# Patient Record
Sex: FEMALE
[De-identification: names, ages, dates, MRNs, and addresses within clinical notes are randomized per-mention and may not be internally consistent; named-entity substitution may affect disease eponyms.]

---

## 2017-09-17 ENCOUNTER — HEALTH MAINTENANCE LETTER (OUTPATIENT)
Age: 57
End: 2017-09-17

## 2019-07-05 ENCOUNTER — TRANSFERRED RECORDS (OUTPATIENT)
Dept: HEALTH INFORMATION MANAGEMENT | Facility: CLINIC | Age: 59
End: 2019-07-05

## 2019-08-01 ENCOUNTER — TRANSFERRED RECORDS (OUTPATIENT)
Dept: HEALTH INFORMATION MANAGEMENT | Facility: CLINIC | Age: 59
End: 2019-08-01

## 2019-08-12 NOTE — TELEPHONE ENCOUNTER
ONCOLOGY INTAKE: Records Information      APPT INFORMATION:  Referring provider:  MADELINE  Referring provider s clinic:  NA  Reason for visit/diagnosis:  2nd opinion - Metastatic breast Cancer to spine  Has patient been notified of appointment date and time?: Per PT    RECORDS INFORMATION:  Were the records received with the referral (via [employer])? No    Has patient been seen for any external appt for this diagnosis? Per PT:    [facility] ([facility]) for radiation  [facility] - Consult and infusion  [facility] - [facility]  Primary: Dr. Tyson - [facility] [place] [facility] [place]    ADDITIONAL INFORMATION:  NA

## 2019-08-13 ENCOUNTER — TRANSFERRED RECORDS (OUTPATIENT)
Dept: HEALTH INFORMATION MANAGEMENT | Facility: CLINIC | Age: 59
End: 2019-08-13

## 2019-08-13 NOTE — TELEPHONE ENCOUNTER
RECORDS STATUS - ALL OTHER DIAGNOSIS      RECORDS RECEIVED FROM: [facility]   DATE RECEIVED: PENDING   NOTES STATUS DETAILS   OFFICE NOTE from referring provider YES [facility]   OFFICE NOTE from medical oncologist NA    DISCHARGE SUMMARY from hospital NA    DISCHARGE REPORT from the ER NA    OPERATIVE REPORT NA    MEDICATION LIST YES CE   CLINICAL TRIAL TREATMENTS TO DATE     LABS YES [facility]   PATHOLOGY REPORTS RECEIVED SENT TO 5TH FLOOR PATH   ANYTHING RELATED TO DIAGNOSIS NA    GENONOMIC TESTING NA    TYPE:     IMAGING (NEED IMAGES & REPORT) PENDING    CT SCANS     MRI     MAMMO     ULTRASOUND     PET       Action ANNA   Action Taken RECEIVED

## 2019-08-21 LAB — COPATH REPORT: NORMAL

## 2019-08-21 PROCEDURE — 00000346 ZZHCL STATISTIC REVIEW OUTSIDE SLIDES TC 88321: Performed by: INTERNAL MEDICINE

## 2019-08-29 NOTE — PROGRESS NOTES
Oncology Consultation:  Date on this visit: 9/4/2019    Carlota Campbell is self referred for an oncology consultation. She requires evaluation for metastatic ER positive breast cancer.    Primary Physician: Anu De Leon     History Of Present Illness:  Ms. Campbell is a 59 year old female with left breast cancer metastasized to lymph nodes, bone, and lungs.  She self palpated a lump in her right breast a number of years ago.  In 03/2019, she began having progressive low back pain.  In the ensuing months, she developed bilateral lower extremity radiculopathy as well as some numbness of the thighs and below the knees.   Her PCP ordered an MRI of the L-spine which showed multiple osseous metastases of the T- and L-spine, compression fracture at L3 with retropulsion into the spinal canal causing moderate spinal canal stenosis, compression fracture of L5 with slight retropulsion, large metastases at L2, L3, and L5, and moderate metastases at L4 and T11.  Epidural metastatic disease was noted bilaterally at L2-3 and L3-4.  In addition, there was a large metastasis in the right iliac bone.  Breast exam showed an ulcerated right breast mass.  Mammogram showed a 6.2 cm mass and at least 7 or 8 associated masses in the right breast as well as right axillary LAD.  Right breast biopsy showed grade 3 invasive carcinoma, ER and NM positive in approximately 90%, and HER-2 non-amplified by IHC.  She completed palliative radiation to the low back on 7/23/19 (these records are not available today).  She met with Dr. Carlene Roldan of medical oncology at [facility].  She started letrozole on 7/8/19.   She started ribociclib on 8/19/19, upon completion of radiation therapy.  It was recommended she also start Zometa, but she has not yet initiated this.    Ms. Campbell presents to clinic today to obtain a second opinion regarding her breast cancer diagnosis and treatment. She has been tolerating treatment with letrozole well  with no reported menopausal-like side effects. She reports that her back pain has improved significantly since completing radiation therapy, and she has been able to reduce her ibuprofen and tizanidine use. However, the radiation did result in 2-3 weeks of diarrhea for which she required IV potassium replacement. These symptoms have since resolved. Similarly, she has not yet experienced side effects from the ribociclib, which she began after completing XRT. She had an EKG yesterday that showed no prolongation of the QT/QTc. She feels well overall and says she feels like the therapy is working, citing the reduction in her back pain and the improvement of former tingling sensations in her right breast. She does endorse a numbing/tingling sensation in her bilateral upper thighs, though this does not interfere with her balance or ambulation. Of note, she does not describe it as the sharp radiating pain of sciatica. She denies bowel or bladder retention or incontinence.  She expressed an interest in physical therapy to help strengthen her back and further improve her pain. Finally, this past Thursday (8/29/2019), she developed an erythematous and mildly pruritic rash over her bilateral legs; the major focus is over the anterolateral aspect of her right thigh. She has been managing this with moisturizing lotion and topical hydrocortisone with moderate relief. Otherwise, she has not been ill recently and she denies fevers, chills, night sweats, weight loss, headaches, changes in vision/hearing, chest pain, SOB, and new/worsening MSK pain.  The remainder of a complete 12 point review of systems was reviewed with the patient and was negative with the exception of that mentioned above.      Past Medical/Surgical History:  Past Medical History:   Diagnosis Date     Elevated blood pressure      Esophageal dysmotility      GERD      Hiatal hernia     small     Multinodular goiter      Past Surgical History:   Procedure  "Laterality Date     none       Allergies:  Allergies as of 09/04/2019 - Reviewed 12/10/2010   Allergen Reaction Noted     Nkda [no known drug allergies]  11/11/2009     Current Medications:  Current Outpatient Medications   Medication Sig Dispense Refill     PRILOSEC PO as needed         Family History:  She denies a family history of malignancy. Her father had late-onset type 2 diabetes mellitus (onset in 70's) and had a myocardial infarction. Her mother has bullous pemphigoid. She has no children.    Social History:  She works at the [employer]; her boss is supportive of her diagnosis. She is  to , Marco.  She does not have any children.  She is a lifetime nonsmoker.  She very rarely drinks alcohol (none this year), and never consumes alcohol with her medications.    Physical Exam:  BP (!) 154/92 (BP Location: Right arm, Patient Position: Sitting, Cuff Size: Adult Regular)   Pulse 85   Temp 98.1  F (36.7  C) (Oral)   Resp 16   Ht 1.626 m (5' 4.02\")   Wt 82.5 kg (181 lb 14.4 oz)   SpO2 97%   BMI 31.21 kg/m    General:  Well appearing, obese adult female in NAD.  HEENT:  Normocephalic.  Sclera anicteric.  MMM.  No lesions of the oropharynx.  Lymph:  No palpable cervical, supraclavicular, or left axillary LAD. Approx. 3 cm node(s) palpable in the right axilla.  Chest:  CTA bilaterally.  No wheezes or crackles.  CV:  RRR.  Nl S1 and S2.  No m/r/g.  Breast:  Bilateral breast are grossly symmetric. Right nipple is retracted and ulcerated with a 5 cm x 7 cm patch of surrounding erythema and induration; there is moderate discharge. There are several small (<5 mm) erythematous, indurated intradermal nodules surrounding the right areola. No additional masses are palpable in the right breast. No masses are palpable in the left breast, which appears grossly normal.  Abd:  Soft/NT/ND.  BSs normoactive.  No hepatosplenomegaly.  Ext:  No pitting edema of the bilateral lower extremities.  Pulses 2+ and " symmetric.  Musculo:  Strength 5/5 throughout.  Neuro:  Cranial nerves grossly intact.  Psych:  Mood and affect appear normal.    Laboratory/Imaging Studies:  7/2/19 Right breast biopsy, 12:00, central:  - Grade 3 invasive mammary carcinoma  - ER strong in >90%   - RI strong in 90%  - HER-2 non-amplified by IHC (score 1+)    7/2/19 Right breast biopsy 11:00, 15 cm from the nipple:  - Grade 3 invasive mammary carcinoma    7/5/19 PET/CT:  - Several medial right supraclavicular FDG avid lymph nodes measuring up to 8 mm in largest size with max SUV of 27.3.    - Small focal increased FDG activity within the right and left lobes of the thyroid at the site of thyroid nodules.  - Multiple FDG avid bilateral pulmonary nodules c/w pulmonary metastasis.    - Multiple small FDG avid mediastinal, bilateral hilar, right axillary, and subcutaneous soft tissue densities and lymph nodes c/w extensive metastasis.  - Large ulcerative right breast mass.  - Small retroperitoneal para-aortic FDG avid lymph nodes.  - Multiple sites of lytic and sclerotic FDG avid bone metastasis involving the bilateral ribs, thoracolumbar spine, and pelvis.  Small FDG avid left gluteal muscle probably metastasis.    8/13/19 Labs:  Alk phos 270    AST 77    ASSESSMENT/PLAN:  Mrs. Campbell is a 59 year old female with [id number] ER+/RI+/HER2 non-amplified Invasive Ductal Carcinoma (grade III) with metastases to the lumbar spine, iliac crests, pulmonary nodules, and numerous axillary and distant lymph nodes s/p 15 cycles of XRT to the lumbar spine (records not available). She is currently on letrozole (7/8/2015) and ribociclib (8/5/2019) with plans to start bisphosphonate therapy pending dental work.     1. Metastatic Invasive Ductal Carcinoma, [id number]  We reviewed the nature of stage IV breast cancer.  We discussed today that while her breast cancer is treatable, it is not at this stage curable.  The goal of treatment is therefore to prolong overall  survival while maintaining quality of life.  As such, in the treatment of metastatic breast cancer, patients are often treated with 1-2 medications at a time in order to limit toxicities for multiple concomitant therapies.  Treatment is continued until either disease progression or intolerable toxicity, at which time another treatment can be tried.    We reinforced that she has been started on the proper treatment. We reviewed that in the treatment of metastatic ER positive breast cancer, unless evidence of organ compromise/impending organ failure, initiation of treatment with endocrine based rather than chemotherapy is recommended.  We discussed this is because endocrine therapies tend to have longer time until disease progression and are associated with less toxicities than chemotherapy.  We discussed standard first line treatment is with combination CDK 4/6 inhibitor and an aromatase inhibitor.  We reviewed that phase III trials of this combination showed prolongation of PFS from approximately 8-10 months with AI alone to approximately 2 years with combined CDK4/6 inhibitor and AI.   Therefore first line treatment with ribociclib and letrozole is reasonable in her case.  We reviewed the potential side effects of both ribociclib and letrozole.      We discussed next line treatment options.  We recommend she have PIK3CA testing of her tumor at this time, in order to evaluate whether future treatment with alpelisib and fulvestrant is a treatment option.  Treatment with everolimus in combination with either exemestane and fulvestrant is also another option.  We do not have a clinical trial for which she is currently eligible, given that she has already started a CDK 4/6 inhibitor, but may have clinical trial options for her when she is considering next lines of therapy.  I encouraged her to consider clinical trials in the future as this will add additional treatment options.    In regards to her bone metastases, pain  is currently well controlled on her current regimen of ibu profen and tizanidine.  We reviewed the benefit of zometa in preventing adverse skeletal affects of having cancer in the bones as well as in the prevention of further bone metastases.  We reviewed the potential side effects of zometa and encouraged her to get her necessary dental work completed as soon as possible so that she may start this medication.  We recommend she continue calcium and vitamin D supplementation and would also refer her to physical therapy to help strengthen her back and minimize the resultant pain.     She asked about her prognosis.  Based on current data the average survival of metastatic ER positive breast cancer is roughly 3-5 years. We counseled her that this is an average, and that her course may be shorter or longer. She expressed her understanding of this. We discussed various local support groups for people living with metastatic breast cancer, which she expressed interest in.  We also gave her information on the 3rd annual [place] Metastatic breast cancer conference that will take place on 11/2/19.    In terms of treatment monitoring, we would recommend she have monthly visits to monitor clinical evidence of response.  In addition, we recommend drawing tumor markers (e.g., CEA and ether CA 27.29 or CA 15-3) now and monthly. We discussed the nature of tumor markers, that they are helpful in monitoring response of some patients to treatment but not in others, and the importance of the trend rather than a single value in determining treatment response.  We would also repeat her PET/CT in 2-3 months to assess treatment response.    Summary of recommendations:  -- Continue letrozole and ribociclib regimen as long as effective  -- Initiate bisphosphonate therapy (Zometa or Xgeva pending insurance) as soon as cleared by dentistry  -- Draw serum tumor antigens (CEA and CA 27.29) now and every month  -- Repeat PET/CT in 2-3 months to  assess response  -- Refer to physical therapy for back strengthening exercises  -- Refer to local support groups for people living with metastatic breast cancer     2.  Transaminitis:  Her LFTs were noted to be elevated on 8/13/2019. She says this draw was prior to her staring the ribociclib, and that she was taking Tylenol at that time to manage her bone pain. She has since stopped taking the Tylenol, as pain improved with XRT. We recommended that her LFTs be rechecked to either document improvement or, if worsening, lend consideration to further work up including hepatitis serologies, serum ferritin, and evaluation for fatty liver.     3. Erythematous Urticarious Rash  Developed an erythematous, urticarious, pruritic rash over the anterolateral aspect of the right thigh with small additional foci on the bilateral lower legs. Has been using moisturizing lotion and topical hydrocortisone with some relief.  -- Continue topical hydrocortisone OTC  -- Start OTC topical diphenhydramine    4. Hypertension  /92 this visit, likely white coat hypertension.   -- Follow up with PCP for further evaluation (including possible ambulatory BP monitoring)    5. GERD  She has a remote history of GERD and was treated with Prilosec. She says her symptoms spontaneously improved several years ago. She takes Tums occasionally (once every few months).  -- Tums PRN  -- Consider EGD if symptoms worsen    6. Enlarged Thyroid  Remote history of enlarged thyroid. She says it was biopsied without suspicious findings. There was no follow-up.  -- Will follow on subsequent imaging    7.  Follow Up:  Patient plans to resume care with Dr. Roldan of [facility] at this time, therefore will not schedule follow up in this clinic.  I provided her with our contact information and asked that she contact us with any future questions.    Patient seen and discussed with attending physician, Dr. Umm Kasper.    Clay Crouch, MS4  Oncology  Service    Patient was seen in conjunction with the medical student.  I have edited the above note to reflect our joint assessment and plan.  A total of 50 minutes of my 60 minute face to face visit was spent in counseling.    Umm Kasper MD

## 2019-09-04 ENCOUNTER — PRE VISIT (OUTPATIENT)
Dept: ONCOLOGY | Facility: CLINIC | Age: 59
End: 2019-09-04

## 2019-09-04 ENCOUNTER — CARE COORDINATION (OUTPATIENT)
Dept: ONCOLOGY | Facility: CLINIC | Age: 59
End: 2019-09-04

## 2019-09-04 ENCOUNTER — ONCOLOGY VISIT (OUTPATIENT)
Dept: ONCOLOGY | Facility: CLINIC | Age: 59
End: 2019-09-04
Attending: INTERNAL MEDICINE
Payer: COMMERCIAL

## 2019-09-04 VITALS
WEIGHT: 181.9 LBS | HEART RATE: 85 BPM | OXYGEN SATURATION: 97 % | RESPIRATION RATE: 16 BRPM | BODY MASS INDEX: 31.05 KG/M2 | SYSTOLIC BLOOD PRESSURE: 154 MMHG | TEMPERATURE: 98.1 F | DIASTOLIC BLOOD PRESSURE: 92 MMHG | HEIGHT: 64 IN

## 2019-09-04 DIAGNOSIS — C50.912 BREAST CANCER METASTASIZED TO MULTIPLE SITES, LEFT (H): Primary | ICD-10-CM

## 2019-09-04 PROCEDURE — G0463 HOSPITAL OUTPT CLINIC VISIT: HCPCS | Mod: ZF

## 2019-09-04 PROCEDURE — 99205 OFFICE O/P NEW HI 60 MIN: CPT | Mod: ZP | Performed by: INTERNAL MEDICINE

## 2019-09-04 RX ORDER — LETROZOLE AND RIBOCICLIB 600-2.5 MG
KIT ORAL
COMMUNITY
Start: 2019-08-30

## 2019-09-04 RX ORDER — IBUPROFEN 400 MG/1
200 TABLET, FILM COATED ORAL PRN
COMMUNITY

## 2019-09-04 RX ORDER — LETROZOLE 2.5 MG/1
1 TABLET, FILM COATED ORAL DAILY
Refills: 0 | COMMUNITY
Start: 2019-07-08

## 2019-09-04 ASSESSMENT — PAIN SCALES - GENERAL: PAINLEVEL: MILD PAIN (2)

## 2019-09-04 ASSESSMENT — MIFFLIN-ST. JEOR: SCORE: 1375.34

## 2019-09-04 NOTE — LETTER
9/4/2019       RE: Carlota Campbell  [address] [facility] [place][note 2] [id number]     Dear Colleague,    Thank you for referring your patient, Carlota Campbell, to the [facility]. Please see a copy of my visit note below.    Oncology Consultation:  Date on this visit: 9/4/2019    Carlota Campbell is self referred for an oncology consultation. She requires evaluation for metastatic ER positive breast cancer.    Primary Physician: Anu De Leon     History Of Present Illness:  Ms. Campbell is a 59 year old female with left breast cancer metastasized to lymph nodes, bone, and lungs.  She self palpated a lump in her right breast a number of years ago.  In 03/2019, she began having progressive low back pain.  In the ensuing months, she developed bilateral lower extremity radiculopathy as well as some numbness of the thighs and below the knees.   Her PCP ordered an MRI of the L-spine which showed multiple osseous metastases of the T- and L-spine, compression fracture at L3 with retropulsion into the spinal canal causing moderate spinal canal stenosis, compression fracture of L5 with slight retropulsion, large metastases at L2, L3, and L5, and moderate metastases at L4 and T11.  Epidural metastatic disease was noted bilaterally at L2-3 and L3-4.  In addition, there was a large metastasis in the right iliac bone.  Breast exam showed an ulcerated right breast mass.  Mammogram showed a 6.2 cm mass and at least 7 or 8 associated masses in the right breast as well as right axillary LAD.  Right breast biopsy showed grade 3 invasive carcinoma, ER and NJ positive in approximately 90%, and HER-2 non-amplified by IHC.  She completed palliative radiation to the low back on 7/23/19 (these records are not available today).  She met with Dr. Carlene Roldan of medical oncology at [facility].  She started letrozole on 7/8/19.   She started ribociclib on 8/19/19, upon completion of radiation therapy.  It was  recommended she also start Zometa, but she has not yet initiated this.    Ms. Campbell presents to clinic today to obtain a second opinion regarding her breast cancer diagnosis and treatment. She has been tolerating treatment with letrozole well with no reported menopausal-like side effects. She reports that her back pain has improved significantly since completing radiation therapy, and she has been able to reduce her ibuprofen and tizanidine use. However, the radiation did result in 2-3 weeks of diarrhea for which she required IV potassium replacement. These symptoms have since resolved. Similarly, she has not yet experienced side effects from the ribociclib, which she began after completing XRT. She had an EKG yesterday that showed no prolongation of the QT/QTc. She feels well overall and says she feels like the therapy is working, citing the reduction in her back pain and the improvement of former tingling sensations in her right breast. She does endorse a numbing/tingling sensation in her bilateral upper thighs, though this does not interfere with her balance or ambulation. Of note, she does not describe it as the sharp radiating pain of sciatica. She denies bowel or bladder retention or incontinence.  She expressed an interest in physical therapy to help strengthen her back and further improve her pain. Finally, this past Thursday (8/29/2019), she developed an erythematous and mildly pruritic rash over her bilateral legs; the major focus is over the anterolateral aspect of her right thigh. She has been managing this with moisturizing lotion and topical hydrocortisone with moderate relief. Otherwise, she has not been ill recently and she denies fevers, chills, night sweats, weight loss, headaches, changes in vision/hearing, chest pain, SOB, and new/worsening MSK pain.  The remainder of a complete 12 point review of systems was reviewed with the patient and was negative with the exception of that mentioned  "above.      Past Medical/Surgical History:  Past Medical History:   Diagnosis Date     Elevated blood pressure      Esophageal dysmotility      GERD      Hiatal hernia     small     Multinodular goiter      Past Surgical History:   Procedure Laterality Date     none       Allergies:  Allergies as of 09/04/2019 - Reviewed 12/10/2010   Allergen Reaction Noted     Nkda [no known drug allergies]  11/11/2009     Current Medications:  Current Outpatient Medications   Medication Sig Dispense Refill     PRILOSEC PO as needed         Family History:  She denies a family history of malignancy. Her father had late-onset type 2 diabetes mellitus (onset in 70's) and had a myocardial infarction. Her mother has bullous pemphigoid. She has no children.    Social History:  She works at the [employer]; her boss is supportive of her diagnosis. She is  to , Marco.  She does not have any children.  She is a lifetime nonsmoker.  She very rarely drinks alcohol (none this year), and never consumes alcohol with her medications.    Physical Exam:  BP (!) 154/92 (BP Location: Right arm, Patient Position: Sitting, Cuff Size: Adult Regular)   Pulse 85   Temp 98.1  F (36.7  C) (Oral)   Resp 16   Ht 1.626 m (5' 4.02\")   Wt 82.5 kg (181 lb 14.4 oz)   SpO2 97%   BMI 31.21 kg/m     General:  Well appearing, obese adult female in NAD.  HEENT:  Normocephalic.  Sclera anicteric.  MMM.  No lesions of the oropharynx.  Lymph:  No palpable cervical, supraclavicular, or left axillary LAD. Approx. 3 cm node(s) palpable in the right axilla.  Chest:  CTA bilaterally.  No wheezes or crackles.  CV:  RRR.  Nl S1 and S2.  No m/r/g.  Breast:  Bilateral breast are grossly symmetric. Right nipple is retracted and ulcerated with a 5 cm x 7 cm patch of surrounding erythema and induration; there is moderate discharge. There are several small (<5 mm) erythematous, indurated intradermal nodules surrounding the right areola. No additional masses are " palpable in the right breast. No masses are palpable in the left breast, which appears grossly normal.  Abd:  Soft/NT/ND.  BSs normoactive.  No hepatosplenomegaly.  Ext:  No pitting edema of the bilateral lower extremities.  Pulses 2+ and symmetric.  Musculo:  Strength 5/5 throughout.  Neuro:  Cranial nerves grossly intact.  Psych:  Mood and affect appear normal.    Laboratory/Imaging Studies:  7/2/19 Right breast biopsy, 12:00, central:  - Grade 3 invasive mammary carcinoma  - ER strong in >90%   - OR strong in 90%  - HER-2 non-amplified by IHC (score 1+)    7/2/19 Right breast biopsy 11:00, 15 cm from the nipple:  - Grade 3 invasive mammary carcinoma    7/5/19 PET/CT:  - Several medial right supraclavicular FDG avid lymph nodes measuring up to 8 mm in largest size with max SUV of 27.3.    - Small focal increased FDG activity within the right and left lobes of the thyroid at the site of thyroid nodules.  - Multiple FDG avid bilateral pulmonary nodules c/w pulmonary metastasis.    - Multiple small FDG avid mediastinal, bilateral hilar, right axillary, and subcutaneous soft tissue densities and lymph nodes c/w extensive metastasis.  - Large ulcerative right breast mass.  - Small retroperitoneal para-aortic FDG avid lymph nodes.  - Multiple sites of lytic and sclerotic FDG avid bone metastasis involving the bilateral ribs, thoracolumbar spine, and pelvis.  Small FDG avid left gluteal muscle probably metastasis.    8/13/19 Labs:  Alk phos 270    AST 77    ASSESSMENT/PLAN:  Mrs. Campbell is a 59 year old female with [id number] ER+/OR+/HER2 non-amplified Invasive Ductal Carcinoma (grade III) with metastases to the lumbar spine, iliac crests, pulmonary nodules, and numerous axillary and distant lymph nodes s/p 15 cycles of XRT to the lumbar spine (records not available). She is currently on letrozole (7/8/2015) and ribociclib (8/5/2019) with plans to start bisphosphonate therapy pending dental work.     1. Metastatic  Invasive Ductal Carcinoma, [id number]  We reviewed the nature of stage IV breast cancer.  We discussed today that while her breast cancer is treatable, it is not at this stage curable.  The goal of treatment is therefore to prolong overall survival while maintaining quality of life.  As such, in the treatment of metastatic breast cancer, patients are often treated with 1-2 medications at a time in order to limit toxicities for multiple concomitant therapies.  Treatment is continued until either disease progression or intolerable toxicity, at which time another treatment can be tried.    We reinforced that she has been started on the proper treatment. We reviewed that in the treatment of metastatic ER positive breast cancer, unless evidence of organ compromise/impending organ failure, initiation of treatment with endocrine based rather than chemotherapy is recommended.  We discussed this is because endocrine therapies tend to have longer time until disease progression and are associated with less toxicities than chemotherapy.  We discussed standard first line treatment is with combination CDK 4/6 inhibitor and an aromatase inhibitor.  We reviewed that phase III trials of this combination showed prolongation of PFS from approximately 8-10 months with AI alone to approximately 2 years with combined CDK4/6 inhibitor and AI.   Therefore first line treatment with ribociclib and letrozole is reasonable in her case.  We reviewed the potential side effects of both ribociclib and letrozole.      We discussed next line treatment options.  We recommend she have PIK3CA testing of her tumor at this time, in order to evaluate whether future treatment with alpelisib and fulvestrant is a treatment option.  Treatment with everolimus in combination with either exemestane and fulvestrant is also another option.  We do not have a clinical trial for which she is currently eligible, given that she has already started a CDK 4/6 inhibitor,  but may have clinical trial options for her when she is considering next lines of therapy.  I encouraged her to consider clinical trials in the future as this will add additional treatment options.    In regards to her bone metastases, pain is currently well controlled on her current regimen of ibu profen and tizanidine.  We reviewed the benefit of zometa in preventing adverse skeletal affects of having cancer in the bones as well as in the prevention of further bone metastases.  We reviewed the potential side effects of zometa and encouraged her to get her necessary dental work completed as soon as possible so that she may start this medication.  We recommend she continue calcium and vitamin D supplementation and would also refer her to physical therapy to help strengthen her back and minimize the resultant pain.     She asked about her prognosis.  Based on current data the average survival of metastatic ER positive breast cancer is roughly 3-5 years. We counseled her that this is an average, and that her course may be shorter or longer. She expressed her understanding of this. We discussed various local support groups for people living with metastatic breast cancer, which she expressed interest in.  We also gave her information on the 3rd annual [place] [facility] breast cancer conference that will take place on 11/2/19.    In terms of treatment monitoring, we would recommend she have monthly visits to monitor clinical evidence of response.  In addition, we recommend drawing tumor markers (e.g., CEA and ether CA 27.29 or CA 15-3) now and monthly. We discussed the nature of tumor markers, that they are helpful in monitoring response of some patients to treatment but not in others, and the importance of the trend rather than a single value in determining treatment response.  We would also repeat her PET/CT in 2-3 months to assess treatment response.    Summary of recommendations:  -- Continue letrozole and  ribociclib regimen as long as effective  -- Initiate bisphosphonate therapy (Zometa or Xgeva pending insurance) as soon as cleared by dentistry  -- Draw serum tumor antigens (CEA and CA 27.29) now and every month  -- Repeat PET/CT in 2-3 months to assess response  -- Refer to physical therapy for back strengthening exercises  -- Refer to local support groups for people living with metastatic breast cancer     2.  Transaminitis:  Her LFTs were noted to be elevated on 8/13/2019. She says this draw was prior to her staring the ribociclib, and that she was taking Tylenol at that time to manage her bone pain. She has since stopped taking the Tylenol, as pain improved with XRT. We recommended that her LFTs be rechecked to either document improvement or, if worsening, lend consideration to further work up including hepatitis serologies, serum ferritin, and evaluation for fatty liver.     3. Erythematous Urticarious Rash  Developed an erythematous, urticarious, pruritic rash over the anterolateral aspect of the right thigh with small additional foci on the bilateral lower legs. Has been using moisturizing lotion and topical hydrocortisone with some relief.  -- Continue topical hydrocortisone OTC  -- Start OTC topical diphenhydramine    4. Hypertension  /92 this visit, likely white coat hypertension.   -- Follow up with PCP for further evaluation (including possible ambulatory BP monitoring)    5. GERD  She has a remote history of GERD and was treated with Prilosec. She says her symptoms spontaneously improved several years ago. She takes Tums occasionally (once every few months).  -- Tums PRN  -- Consider EGD if symptoms worsen    6. Enlarged Thyroid  Remote history of enlarged thyroid. She says it was biopsied without suspicious findings. There was no follow-up.  -- Will follow on subsequent imaging    7.  Follow Up:  Patient plans to resume care with Dr. Roldan of [facility] at this time, therefore will not  schedule follow up in this clinic.  I provided her with our contact information and asked that she contact us with any future questions.    Patient seen and discussed with attending physician, Dr. Umm Kasper.    Clay Crouch, MS4  Oncology Service    Patient was seen in conjunction with the medical student.  I have edited the above note to reflect our joint assessment and plan.  A total of 50 minutes of my 60 minute face to face visit was spent in counseling.    Umm Kasper MD

## 2019-09-04 NOTE — NURSING NOTE
"Oncology Rooming Note    September 4, 2019 8:44 AM   Carlota Campbell is a 59 year old female who presents for:    Chief Complaint   Patient presents with     Oncology Clinic Visit     Metastic breast cancer      Initial Vitals: There were no vitals taken for this visit. Estimated body mass index is 27.87 kg/m  as calculated from the following:    Height as of 12/10/10: 1.626 m (5' 4\").    Weight as of 12/10/10: 73.7 kg (162 lb 6 oz). There is no height or weight on file to calculate BSA.  Data Unavailable Comment: Data Unavailable   No LMP recorded. Patient is postmenopausal.  Allergies reviewed: Yes  Medications reviewed: Yes    Medications: Medication refills not needed today.  Pharmacy name entered into [facility]: [id number] [address] [place] [address] 10 AT [facility] [facility]    Clinical concerns: none        Valery Danny, SADIE              "

## 2019-09-04 NOTE — PROGRESS NOTES
Met new patient in clinic visit.  Discussed resources available and gave brochure for [facility].  Pt is interested in attending.  Pt was given our business cards.  Pt was walked out to lobby.      Shira Seaman RNCC BSN CBCN

## 2019-11-09 ENCOUNTER — HEALTH MAINTENANCE LETTER (OUTPATIENT)
Age: 59
End: 2019-11-09

## 2020-02-23 ENCOUNTER — HEALTH MAINTENANCE LETTER (OUTPATIENT)
Age: 60
End: 2020-02-23

## 2020-12-06 ENCOUNTER — HEALTH MAINTENANCE LETTER (OUTPATIENT)
Age: 60
End: 2020-12-06

## 2021-09-26 ENCOUNTER — HEALTH MAINTENANCE LETTER (OUTPATIENT)
Age: 61
End: 2021-09-26

## 2021-11-20 ENCOUNTER — HEALTH MAINTENANCE LETTER (OUTPATIENT)
Age: 61
End: 2021-11-20

## 2022-01-15 ENCOUNTER — HEALTH MAINTENANCE LETTER (OUTPATIENT)
Age: 62
End: 2022-01-15

## 2023-04-23 ENCOUNTER — HEALTH MAINTENANCE LETTER (OUTPATIENT)
Age: 63
End: 2023-04-23

## 2023-12-02 ENCOUNTER — HEALTH MAINTENANCE LETTER (OUTPATIENT)
Age: 63
End: 2023-12-02